# Patient Record
Sex: FEMALE | Race: WHITE
[De-identification: names, ages, dates, MRNs, and addresses within clinical notes are randomized per-mention and may not be internally consistent; named-entity substitution may affect disease eponyms.]

---

## 2018-10-19 ENCOUNTER — HOSPITAL ENCOUNTER (OUTPATIENT)
Dept: HOSPITAL 54 - DS | Age: 58
Discharge: HOME | End: 2018-10-19
Attending: SPECIALIST
Payer: COMMERCIAL

## 2018-10-19 VITALS — DIASTOLIC BLOOD PRESSURE: 80 MMHG | SYSTOLIC BLOOD PRESSURE: 140 MMHG

## 2018-10-19 VITALS — SYSTOLIC BLOOD PRESSURE: 124 MMHG | DIASTOLIC BLOOD PRESSURE: 74 MMHG

## 2018-10-19 VITALS — WEIGHT: 202 LBS | BODY MASS INDEX: 34.49 KG/M2 | HEIGHT: 64 IN

## 2018-10-19 VITALS — SYSTOLIC BLOOD PRESSURE: 140 MMHG | DIASTOLIC BLOOD PRESSURE: 80 MMHG

## 2018-10-19 DIAGNOSIS — Z98.890: ICD-10-CM

## 2018-10-19 DIAGNOSIS — Z79.899: ICD-10-CM

## 2018-10-19 DIAGNOSIS — M19.011: Primary | ICD-10-CM

## 2018-10-19 DIAGNOSIS — M65.811: ICD-10-CM

## 2018-10-19 DIAGNOSIS — M75.41: ICD-10-CM

## 2018-10-19 PROCEDURE — 29826 SHO ARTHRS SRG DECOMPRESSION: CPT

## 2018-10-19 PROCEDURE — 88304 TISSUE EXAM BY PATHOLOGIST: CPT

## 2018-10-19 PROCEDURE — A4217 STERILE WATER/SALINE, 500 ML: HCPCS

## 2018-10-19 PROCEDURE — 87081 CULTURE SCREEN ONLY: CPT

## 2018-10-19 PROCEDURE — Z7610: HCPCS

## 2018-10-19 PROCEDURE — 29824 SHO ARTHRS SRG DSTL CLAVICLC: CPT

## 2018-10-19 PROCEDURE — 88311 DECALCIFY TISSUE: CPT

## 2018-10-19 NOTE — NUR
MS/RN NOTES

PATIENT WAS PICKED UP BY OPERATING ROOM NURSE FOR SURGERY, PRE OP CHECKLIST AND CONSENT 
REVIEWED, TRANSFERED ON A GURNEY ACCOMPANIED BY FAMILY MEMBER.

## 2018-10-19 NOTE — NUR
ms/rn notes

RECEIVED PATIENT FOR DAY SURGERY, ADMITTING MD STONE FOR PROCEDURE OR RIGHT SHOULDER 
ARTHROSCOPY, A 57 YO FEMALE WITH ELECTIVE DAY SURGERY, ALERT, ORIENTED X3, AMBULATORY, 
ARRIVED WITH FAMILY. SKIN INTACT, NO PAIN VERBALIZED AND OBSERVED, PARTICIPATIVE AND 
COOPERATIVE TO CARE, RESPIRATIONS EVEN AND UNLABORED,LEFT HAND GAUGE 18 INSERTED, PREPARED 
FOR SURGERY ON NPO STATUS. WITH HX OF MULTIPLE SURGERY, AND NERVE PAIN. CALL LIGHTS WITHIN 
REACH, BELONGINGS CHECK, ROOM ORIENTATION PROVIDED, CONSENT SIGNED.

## 2018-10-19 NOTE — NUR
PT CAME BACK FROM SURGERY AWAKE A/O X4. NO DISTRESS NOTED AT THIS TIME. PT DENIED PAIN AT 
THIS TIME. VS ARE STABLE, O2 SATURATION 96 ON ROOM AIR. ORDERS ARE CARRIED OUT. PT CAN BE 
DISCARDED IF STABLE.WILL CONTINUE TO MONITOR